# Patient Record
Sex: FEMALE | Race: BLACK OR AFRICAN AMERICAN | Employment: FULL TIME | URBAN - METROPOLITAN AREA
[De-identification: names, ages, dates, MRNs, and addresses within clinical notes are randomized per-mention and may not be internally consistent; named-entity substitution may affect disease eponyms.]

---

## 2020-06-24 ENCOUNTER — HOSPITAL ENCOUNTER (EMERGENCY)
Age: 25
Discharge: HOME | End: 2020-06-24
Payer: OTHER GOVERNMENT

## 2020-06-24 VITALS
RESPIRATION RATE: 18 BRPM | HEART RATE: 65 BPM | DIASTOLIC BLOOD PRESSURE: 97 MMHG | OXYGEN SATURATION: 99 % | TEMPERATURE: 97.9 F | SYSTOLIC BLOOD PRESSURE: 132 MMHG

## 2020-06-24 PROCEDURE — U0003 INFECTIOUS AGENT DETECTION BY NUCLEIC ACID (DNA OR RNA); SEVERE ACUTE RESPIRATORY SYNDROME CORONAVIRUS 2 (SARS-COV-2) (CORONAVIRUS DISEASE [COVID-19]), AMPLIFIED PROBE TECHNIQUE, MAKING USE OF HIGH THROUGHPUT TECHNOLOGIES AS DESCRIBED BY CMS-2020-01-R: HCPCS

## 2020-06-24 PROCEDURE — U0002 COVID-19 LAB TEST NON-CDC: HCPCS

## 2020-06-24 PROCEDURE — 99281 EMR DPT VST MAYX REQ PHY/QHP: CPT

## 2020-06-24 ASSESSMENT — ENCOUNTER SYMPTOMS
EYE DISCHARGE: 0
APNEA: 0
EYE REDNESS: 0
ABDOMINAL PAIN: 0
BACK PAIN: 0
FACIAL SWELLING: 0
NAUSEA: 0
SHORTNESS OF BREATH: 0
SORE THROAT: 0
VOMITING: 0
CHOKING: 0

## 2020-06-24 ASSESSMENT — PAIN DESCRIPTION - FREQUENCY: FREQUENCY: CONTINUOUS

## 2020-06-24 ASSESSMENT — PAIN DESCRIPTION - ORIENTATION: ORIENTATION: MID

## 2020-06-24 ASSESSMENT — PAIN DESCRIPTION - LOCATION: LOCATION: BACK

## 2020-06-24 ASSESSMENT — PAIN DESCRIPTION - DESCRIPTORS: DESCRIPTORS: ACHING;CONSTANT

## 2020-06-24 ASSESSMENT — PAIN SCALES - GENERAL: PAINLEVEL_OUTOF10: 5

## 2020-06-24 ASSESSMENT — PAIN DESCRIPTION - PAIN TYPE: TYPE: CHRONIC PAIN

## 2020-06-24 ASSESSMENT — PAIN DESCRIPTION - ONSET: ONSET: PROGRESSIVE

## 2020-06-24 ASSESSMENT — PAIN DESCRIPTION - PROGRESSION: CLINICAL_PROGRESSION: GRADUALLY WORSENING

## 2020-06-24 ASSESSMENT — PAIN SCALES - WONG BAKER: WONGBAKER_NUMERICALRESPONSE: 4

## 2020-06-24 NOTE — ED PROVIDER NOTES
radiologic plain film image(s) with the below findings:        Interpretation per the Radiologist below, if available at the time of this note:    No orders to display        No results found. MEDICAL DECISION MAKING / ED COURSE:      PROCEDURES:   Procedures    None    Patient was given:  Medications - No data to display      Emergency room course: Patient on exam throat is clear nonerythematous no exudate. Cardiovascular regular rate rhythm, lungs are clear. No wheeze, rales or rhonchi noted. No chest wall tenderness with palpation. Abdomen is soft nontender. Normal bowel sounds all 4 quadrant. Patient full range of motion of all extremity. Neurologically she has no motor or sensory deficit noted. She is alert oriented x4. Does not appear to be in acute distress. Patient had a a swab for the COVID. I informed her that we would not get results back today. And I asked her when she supposed to go back to work. She says today. So since she cannot go back today and next workday is on Sunday. I will have her have a return to work note for Sunday. And I told her she can check my chart for her results. If it is positive she will to quarantine herself for 2 weeks. And she should keep her self quarantine until she get her results. She verbally stated she understood discharge plan she will be discharged stable condition. The patient tolerated their visit well. I evaluated the patient. The physician was available for consultation as needed. The patient and / or the family were informed of the results of any tests, a time was given to answer questions, a plan was proposed and they agreed with plan. CLINICAL IMPRESSION:  1. Encounter for laboratory testing for COVID-19 virus        DISPOSITION Decision To Discharge 06/24/2020 06:53:24 PM      PATIENT REFERRED TO:  No follow-up provider specified.     DISCHARGE MEDICATIONS:  New Prescriptions    No medications on file       DISCONTINUED MEDICATIONS:  Discontinued Medications    No medications on file              (Please note the MDM and HPI sections of this note were completed with a voice recognition program.  Efforts were made to edit the dictations but occasionally words are mis-transcribed.)    Electronically signed, Tona Roman PA-C,          Tona Roman PA-C  06/24/20 [address] [place] [address]

## 2020-06-24 NOTE — ED NOTES
Discharge and education instructions reviewed. Patient verbalized understanding, teach-back successful. Patient denied questions at this time. No acute distress noted. Patient instructed to follow-up as noted - return to emergency department if symptoms worsen. Patient verbalized understanding. Discharged per EDMD with discharged instructions.        Marta Arellano RN  06/24/20 [id number]

## 2020-06-25 ENCOUNTER — CARE COORDINATION (OUTPATIENT)
Dept: CARE COORDINATION | Age: 25
End: 2020-06-25

## 2020-06-25 LAB — SARS-COV-2, PCR: NOT DETECTED

## 2020-06-26 NOTE — CARE COORDINATION
Chart reviewed. Pt seen and evaluated in ED for Concern For COVID-19. Pt given the following referrals/recommendations (see below): None-    Initial ED f/u call to pt attempted. Unable to reach pt. Message left requesting return call. If no return call, will plan second outreach tomorrow. FU appts/Provider:    No future appointments.       Kristina Caraballo BSN, RN Care Manager  [phone] [facility],  [address] [place] [address] Primary Care
diagnosis     Patient/family/caregiver given information for [facility] and agrees to enroll no    Plan for follow-up call in 5-7 days based on severity of symptoms and risk factors.     Last Robb BSN, RN Care Manager  [phone] [address] Primary [facility]

## 2020-06-27 NOTE — RESULT ENCOUNTER NOTE
Patient's negative result has been appropriately evaluated by the provider pool. Patient's mailbox is still full. Unable to reach patient.

## 2020-07-01 ENCOUNTER — CARE COORDINATION (OUTPATIENT)
Dept: CARE COORDINATION | Age: 25
End: 2020-07-01

## 2020-07-01 NOTE — CARE COORDINATION
ED F/U call to pt. Pt stated, \"I am feeling so much better! Thank you for checking on me again\". ACM encouraged pt when she get her ED bill to make sure she calls the [employer] number to update her Care [facility] info and to follow up with her PCP Clinic at [address] [place] [address] if she has any firther questions ro concerns related to [facility]. Pt voiced understanding. You Patient resolved from the Care Transitions episode on 7/1/2020 (ED visit 6/24/2020)  Discussed COVID-19 related testing which was available at this time. Test results were negative. Patient informed of results, if available? Previously discussed    Patient/family has been provided the following resources and education related to COVID-19:                         Signs, symptoms and red flags related to COVID-19            CDC exposure and quarantine guidelines            [facility] exposure contact - [phone]            Contact for their local Department of Health               Patient currently reports that the following symptoms have improved:  no new/worsening symptoms     No further outreach scheduled with this CTN/ACM. Episode of Care resolved. Patient has this CTN/ACM contact information if future needs arise.     Elisabeth CESPEDESN, RN Care Manager  [phone]  [address] [place] [address],  [address] [place] [address] Primary Care